# Patient Record
Sex: FEMALE | Race: WHITE | URBAN - METROPOLITAN AREA
[De-identification: names, ages, dates, MRNs, and addresses within clinical notes are randomized per-mention and may not be internally consistent; named-entity substitution may affect disease eponyms.]

---

## 2020-07-06 ENCOUNTER — OFFICE VISIT (OUTPATIENT)
Dept: URBAN - METROPOLITAN AREA TELEHEALTH 2 | Facility: TELEHEALTH | Age: 25
End: 2020-07-06
Payer: COMMERCIAL

## 2020-07-06 DIAGNOSIS — R19.4 CHANGE IN BOWEL HABITS: ICD-10-CM

## 2020-07-06 DIAGNOSIS — Z87.19 HISTORY OF ESOPHAGEAL STRICTURE: ICD-10-CM

## 2020-07-06 DIAGNOSIS — R19.7 DIARRHEA: ICD-10-CM

## 2020-07-06 DIAGNOSIS — K21.0 REFLUX ESOPHAGITIS: ICD-10-CM

## 2020-07-06 PROCEDURE — 99214 OFFICE O/P EST MOD 30 MIN: CPT | Performed by: INTERNAL MEDICINE

## 2020-07-06 PROCEDURE — 1036F TOBACCO NON-USER: CPT | Performed by: INTERNAL MEDICINE

## 2020-07-06 PROCEDURE — G9903 PT SCRN TBCO ID AS NON USER: HCPCS | Performed by: INTERNAL MEDICINE

## 2020-07-06 PROCEDURE — G8420 CALC BMI NORM PARAMETERS: HCPCS | Performed by: INTERNAL MEDICINE

## 2020-07-06 PROCEDURE — G8427 DOCREV CUR MEDS BY ELIG CLIN: HCPCS | Performed by: INTERNAL MEDICINE

## 2020-07-06 RX ORDER — NORGESTIMATE AND ETHINYL ESTRADIOL
TAKE 1 TABLET BY ORAL ROUTE ONCE DAILY KIT 1
Qty: 0 | Refills: 0 | Status: ACTIVE | COMMUNITY
Start: 1900-01-01 | End: 1900-01-01

## 2020-07-06 RX ORDER — OMEPRAZOLE 20 MG/1
TAKE 1 CAPSULE (20 MG) BY ORAL ROUTE ONCE DAILY BEFORE A MEAL CAPSULE, DELAYED RELEASE ORAL 1
Qty: 30 | Refills: 1 | Status: ACTIVE | COMMUNITY
Start: 2019-10-21 | End: 1900-01-01

## 2020-07-06 NOTE — HPI-TODAY'S VISIT:
25-year-old female previously seen in clinic on 10/21/2019 for dysphagia.  Upper endoscopy on 8/2/2018 with normal duodenal biopsies, normal esophageal biopsies aside from reflux inflammation on lower esophageal biopsies.  No evidence of EOE.  Esophageal stricture was dilated with 18 mm balloon and prescription for pantoprazole 40 mg was provided.  Chest pain and dysphasia had resolved post dilation. Patient reports stopped pantoprazole and changed to omeprazole which caused diarrhea which persisted despite stopping omeprazole. No f/c, no melena, no rectal bleeding, no unintentional weight loss, no nocturnal sx. No recent antibiotics, no sick exposure, no food allergies, no new pets. No food allergies or diet restrictions. No chance pregnant, no diet or med changes. Changed to low FODMAP diet which has helped minimally with stools. No eating d/o, no psych hx. No NSAIDs. No alcohol. No hx of pancreatitis. Took Culturelle with no improvement.

## 2020-07-31 ENCOUNTER — WEB ENCOUNTER (OUTPATIENT)
Dept: URBAN - METROPOLITAN AREA CLINIC 96 | Facility: CLINIC | Age: 25
End: 2020-07-31

## 2021-05-26 ENCOUNTER — WEB ENCOUNTER (OUTPATIENT)
Dept: URBAN - METROPOLITAN AREA CLINIC 96 | Facility: CLINIC | Age: 26
End: 2021-05-26

## 2021-06-23 ENCOUNTER — WEB ENCOUNTER (OUTPATIENT)
Dept: URBAN - METROPOLITAN AREA CLINIC 96 | Facility: CLINIC | Age: 26
End: 2021-06-23

## 2021-06-29 ENCOUNTER — WEB ENCOUNTER (OUTPATIENT)
Dept: URBAN - METROPOLITAN AREA CLINIC 96 | Facility: CLINIC | Age: 26
End: 2021-06-29

## 2021-06-29 ENCOUNTER — OFFICE VISIT (OUTPATIENT)
Dept: URBAN - METROPOLITAN AREA CLINIC 96 | Facility: CLINIC | Age: 26
End: 2021-06-29
Payer: COMMERCIAL

## 2021-06-29 VITALS
HEART RATE: 85 BPM | DIASTOLIC BLOOD PRESSURE: 76 MMHG | TEMPERATURE: 97.5 F | BODY MASS INDEX: 20.62 KG/M2 | WEIGHT: 120.8 LBS | HEIGHT: 64 IN | SYSTOLIC BLOOD PRESSURE: 121 MMHG

## 2021-06-29 DIAGNOSIS — Z87.19 HISTORY OF ESOPHAGEAL STRICTURE: ICD-10-CM

## 2021-06-29 DIAGNOSIS — K21.00 CHRONIC REFLUX ESOPHAGITIS: ICD-10-CM

## 2021-06-29 DIAGNOSIS — R13.10 DYSPHAGIA, UNSPECIFIED TYPE: ICD-10-CM

## 2021-06-29 PROCEDURE — 99214 OFFICE O/P EST MOD 30 MIN: CPT | Performed by: INTERNAL MEDICINE

## 2021-06-29 RX ORDER — DEXTROAMPHETAMINE/AMPHETAMINE 10 MG
1 CAPSULE IN THE MORNING CAPSULE, EXT RELEASE 24 HR ORAL ONCE A DAY
Status: ACTIVE | COMMUNITY

## 2021-06-29 RX ORDER — SPIRONOLACTONE 100 MG/1
1 TABLET TABLET, FILM COATED ORAL ONCE A DAY
Status: ACTIVE | COMMUNITY

## 2021-06-29 RX ORDER — OMEPRAZOLE 20 MG/1
TAKE 1 CAPSULE (20 MG) BY ORAL ROUTE ONCE DAILY BEFORE A MEAL CAPSULE, DELAYED RELEASE ORAL 1
Qty: 30 | Refills: 1 | Status: DISCONTINUED | COMMUNITY
Start: 2019-10-21

## 2021-06-29 RX ORDER — NORGESTIMATE AND ETHINYL ESTRADIOL
TAKE 1 TABLET BY ORAL ROUTE ONCE DAILY KIT 1
Qty: 0 | Refills: 0 | Status: ACTIVE | COMMUNITY
Start: 1900-01-01

## 2021-06-29 NOTE — PHYSICAL EXAM GASTROINTESTINAL
Abdomen , soft, minimally tender in the epigastric area , no guarding or rigidity , no masses palpable , normal bowel sounds , Liver and Spleen , no hepatomegaly present , no hepatosplenomegaly , liver nontender , spleen not palpable

## 2021-06-29 NOTE — HPI-TODAY'S VISIT:
26-year-old female previously seen in clinic on 7/6/2020 via Telehealth, previously seen for dysphagia.  Upper endoscopy on 8/2/2018 with normal duodenal biopsies, normal esophageal biopsies aside from reflux inflammation on lower esophageal biopsies.  No evidence of EOE.  Esophageal stricture was dilated with 18 mm balloon and prescription for pantoprazole 40 mg was provided.  Chest pain and dysphasia had resolved post dilation.   Previously reported stopped pantoprazole and changed to omeprazole which caused diarrhea which persisted despite stopping omeprazole. No f/c, no melena, no rectal bleeding, no unintentional weight loss, no nocturnal sx. No recent antibiotics, no sick exposure, no food allergies, no new pets. No food allergies or diet restrictions. No chance pregnant, no diet or med changes. Changed to low FODMAP diet which has helped minimally with stools. No eating d/o, no psych hx. No NSAIDs. No alcohol. No hx of pancreatitis. Took Culturelle with no improvement.  Patient reports did not turn in stool samples as previously ordered. Reports she did not feel was needed given diarrhea intermittently longstanding.  Now presents for dysphagia. No hematemesis, no coffee ground emesis. No melena, no rectal bleeding. Solid food dysphagia. Not taking any meds for GERD currently. No diet changes. Now on Adderall for ADHD. Also on spironolactone for acne. No NSAIDs. Similar sx she experienced prior to 2018 dilation. No chance pregnant. No regular alcohol.

## 2021-08-20 ENCOUNTER — OFFICE VISIT (OUTPATIENT)
Dept: URBAN - METROPOLITAN AREA SURGERY CENTER 18 | Facility: SURGERY CENTER | Age: 26
End: 2021-08-20
Payer: COMMERCIAL

## 2021-08-20 DIAGNOSIS — K22.2 ACQUIRED ESOPHAGEAL RING: ICD-10-CM

## 2021-08-20 DIAGNOSIS — R13.19 CERVICAL DYSPHAGIA: ICD-10-CM

## 2021-08-20 DIAGNOSIS — K31.89 ACQUIRED DEFORMITY OF DUODENUM: ICD-10-CM

## 2021-08-20 PROCEDURE — G8907 PT DOC NO EVENTS ON DISCHARG: HCPCS | Performed by: INTERNAL MEDICINE

## 2021-08-20 PROCEDURE — 43249 ESOPH EGD DILATION <30 MM: CPT | Performed by: INTERNAL MEDICINE

## 2021-08-20 PROCEDURE — 43239 EGD BIOPSY SINGLE/MULTIPLE: CPT | Performed by: INTERNAL MEDICINE

## 2021-10-06 ENCOUNTER — WEB ENCOUNTER (OUTPATIENT)
Dept: URBAN - METROPOLITAN AREA CLINIC 96 | Facility: CLINIC | Age: 26
End: 2021-10-06

## 2021-10-06 ENCOUNTER — OFFICE VISIT (OUTPATIENT)
Dept: URBAN - METROPOLITAN AREA CLINIC 96 | Facility: CLINIC | Age: 26
End: 2021-10-06
Payer: COMMERCIAL

## 2021-10-06 VITALS
SYSTOLIC BLOOD PRESSURE: 111 MMHG | HEART RATE: 79 BPM | DIASTOLIC BLOOD PRESSURE: 76 MMHG | TEMPERATURE: 96.6 F | HEIGHT: 64 IN | WEIGHT: 124.8 LBS | BODY MASS INDEX: 21.31 KG/M2

## 2021-10-06 DIAGNOSIS — K22.2 ESOPHAGEAL STRICTURE: ICD-10-CM

## 2021-10-06 DIAGNOSIS — R13.10 DYSPHAGIA, UNSPECIFIED TYPE: ICD-10-CM

## 2021-10-06 DIAGNOSIS — K21.0 REFLUX ESOPHAGITIS: ICD-10-CM

## 2021-10-06 DIAGNOSIS — Z87.19 HISTORY OF ESOPHAGEAL STRICTURE: ICD-10-CM

## 2021-10-06 PROCEDURE — 99214 OFFICE O/P EST MOD 30 MIN: CPT | Performed by: INTERNAL MEDICINE

## 2021-10-06 RX ORDER — DEXTROAMPHETAMINE/AMPHETAMINE 10 MG
1 CAPSULE IN THE MORNING CAPSULE, EXT RELEASE 24 HR ORAL ONCE A DAY
Status: ACTIVE | COMMUNITY

## 2021-10-06 RX ORDER — SPIRONOLACTONE 100 MG/1
1 TABLET TABLET, FILM COATED ORAL ONCE A DAY
Status: ACTIVE | COMMUNITY

## 2021-10-06 RX ORDER — FAMOTIDINE 20 MG/1
1 TABLET TABLET, FILM COATED ORAL BID
Qty: 60 | Refills: 6 | OUTPATIENT
Start: 2021-10-06

## 2021-10-06 RX ORDER — NORGESTIMATE AND ETHINYL ESTRADIOL
TAKE 1 TABLET BY ORAL ROUTE ONCE DAILY KIT 1
Qty: 0 | Refills: 0 | Status: ACTIVE | COMMUNITY
Start: 1900-01-01

## 2021-10-06 NOTE — HPI-TODAY'S VISIT:
26-year-old female seen 6/29/2021 for dysphagia.  Upper endoscopy on 8/2/2018 with normal duodenal biopsies, normal esophageal biopsies aside from reflux inflammation on lower esophageal biopsies.  No evidence of EOE.  Esophageal stricture was dilated with 18 mm balloon and prescription for pantoprazole 40 mg was provided.  Chest pain and dysphasia had resolved post dilation.   Previously reported stopped pantoprazole and changed to omeprazole which caused diarrhea which persisted despite stopping omeprazole. No f/c, no melena, no rectal bleeding, no unintentional weight loss, no nocturnal sx. No recent antibiotics, no sick exposure, no food allergies, no new pets. No food allergies or diet restrictions. No chance pregnant, no diet or med changes. Changed to low FODMAP diet which has helped minimally with stools. No eating d/o, no psych hx. No NSAIDs. No alcohol. No hx of pancreatitis. Took Culturelle with no improvement.  Patient reports did not turn in stool samples as previously ordered. Reports she did not feel was needed given diarrhea intermittently longstanding.  Reported dysphagia. No hematemesis, no coffee ground emesis. No melena, no rectal bleeding. Solid food dysphagia. Not taking any meds for GERD currently. No diet changes. Now on Adderall for ADHD. Also on spironolactone for acne. No NSAIDs. Similar sx she experienced prior to 2018 dilation. No chance pregnant. No regular alcohol.  EGD done 8/20/2021 with normal duodenal bx, normal gastric bx, normal upper and lower esophageal bx with no EoE. Esophageal dilation performed with 18 mm balloon.  Patient reports overall improved. No dysphagia, occasionally will have mild odynophagia with swallowing.

## 2021-11-01 ENCOUNTER — ERX REFILL RESPONSE (OUTPATIENT)
Dept: URBAN - METROPOLITAN AREA CLINIC 96 | Facility: CLINIC | Age: 26
End: 2021-11-01

## 2021-11-01 RX ORDER — FAMOTIDINE 20 MG/1
1 TABLET TABLET, FILM COATED ORAL BID
Qty: 60 | Refills: 6 | OUTPATIENT

## 2021-11-01 RX ORDER — FAMOTIDINE 20 MG/1
TAKE 1 TABLET BY MOUTH TWICE A DAY FOR 30 DAYS TABLET, FILM COATED ORAL
Qty: 60 TABLET | Refills: 7 | OUTPATIENT

## 2021-11-22 ENCOUNTER — WEB ENCOUNTER (OUTPATIENT)
Dept: URBAN - METROPOLITAN AREA CLINIC 96 | Facility: CLINIC | Age: 26
End: 2021-11-22

## 2021-11-22 ENCOUNTER — OFFICE VISIT (OUTPATIENT)
Dept: URBAN - METROPOLITAN AREA CLINIC 96 | Facility: CLINIC | Age: 26
End: 2021-11-22
Payer: COMMERCIAL

## 2021-11-22 ENCOUNTER — DASHBOARD ENCOUNTERS (OUTPATIENT)
Age: 26
End: 2021-11-22

## 2021-11-22 VITALS
HEIGHT: 64 IN | HEART RATE: 80 BPM | BODY MASS INDEX: 21 KG/M2 | WEIGHT: 123 LBS | TEMPERATURE: 98.7 F | DIASTOLIC BLOOD PRESSURE: 63 MMHG | SYSTOLIC BLOOD PRESSURE: 101 MMHG

## 2021-11-22 DIAGNOSIS — Z87.19 HISTORY OF ESOPHAGEAL STRICTURE: ICD-10-CM

## 2021-11-22 DIAGNOSIS — R13.19 DYSPHAGIA: ICD-10-CM

## 2021-11-22 DIAGNOSIS — K21.00 CHRONIC REFLUX ESOPHAGITIS: ICD-10-CM

## 2021-11-22 DIAGNOSIS — K22.2 ESOPHAGEAL STRICTURE: ICD-10-CM

## 2021-11-22 PROBLEM — 40739000: Status: ACTIVE | Noted: 2021-06-29

## 2021-11-22 PROBLEM — 266433003 REFLUX ESOPHAGITIS: Status: ACTIVE | Noted: 2021-06-28

## 2021-11-22 PROCEDURE — 99213 OFFICE O/P EST LOW 20 MIN: CPT | Performed by: INTERNAL MEDICINE

## 2021-11-22 RX ORDER — DEXTROAMPHETAMINE/AMPHETAMINE 10 MG
1 CAPSULE IN THE MORNING CAPSULE, EXT RELEASE 24 HR ORAL ONCE A DAY
Status: ACTIVE | COMMUNITY

## 2021-11-22 RX ORDER — SPIRONOLACTONE 100 MG/1
1 TABLET TABLET, FILM COATED ORAL ONCE A DAY
Status: ACTIVE | COMMUNITY

## 2021-11-22 RX ORDER — FAMOTIDINE 40 MG/1
1 TABLET TABLET, FILM COATED ORAL BID
Qty: 60 | Refills: 6 | OUTPATIENT

## 2021-11-22 RX ORDER — FAMOTIDINE 20 MG/1
TAKE 1 TABLET BY MOUTH TWICE A DAY FOR 30 DAYS TABLET, FILM COATED ORAL
Qty: 60 TABLET | Refills: 7 | Status: ACTIVE | COMMUNITY

## 2021-11-22 RX ORDER — NORGESTIMATE AND ETHINYL ESTRADIOL
TAKE 1 TABLET BY ORAL ROUTE ONCE DAILY KIT 1
Qty: 0 | Refills: 0 | Status: ACTIVE | COMMUNITY
Start: 1900-01-01

## 2021-11-22 NOTE — HPI-TODAY'S VISIT:
Once a weeks he will still get the feeling of food wanting to come back up - and some odynophagia Mucus comes up at times Is painful until the mucus comes up  Was worse before the Pepcid Has not had a barium swallow test done Her last egd was 8/21 - dilated - pathology looked great No abd pain More with breads, cheeses and french fries BM are wnl - no more diarrhea on a regular basis In past when on a PPI got diarrhea but then she stopped it and it continued for 1 year after - doubt was the PPI since lasted for a year off the medication No BRBRP or melena

## 2021-11-30 ENCOUNTER — LAB OUTSIDE AN ENCOUNTER (OUTPATIENT)
Dept: URBAN - METROPOLITAN AREA CLINIC 96 | Facility: CLINIC | Age: 26
End: 2021-11-30

## 2021-12-01 ENCOUNTER — TELEPHONE ENCOUNTER (OUTPATIENT)
Dept: URBAN - METROPOLITAN AREA CLINIC 92 | Facility: CLINIC | Age: 26
End: 2021-12-01

## 2021-12-01 ENCOUNTER — LAB OUTSIDE AN ENCOUNTER (OUTPATIENT)
Dept: URBAN - METROPOLITAN AREA CLINIC 92 | Facility: CLINIC | Age: 26
End: 2021-12-01

## 2021-12-06 ENCOUNTER — WEB ENCOUNTER (OUTPATIENT)
Dept: URBAN - METROPOLITAN AREA CLINIC 96 | Facility: CLINIC | Age: 26
End: 2021-12-06

## 2021-12-29 ENCOUNTER — TELEPHONE ENCOUNTER (OUTPATIENT)
Dept: URBAN - METROPOLITAN AREA CLINIC 40 | Facility: CLINIC | Age: 26
End: 2021-12-29

## 2022-01-10 PROBLEM — 63305008: Status: ACTIVE | Noted: 2021-10-06

## 2022-01-16 ENCOUNTER — WEB ENCOUNTER (OUTPATIENT)
Dept: URBAN - METROPOLITAN AREA CLINIC 96 | Facility: CLINIC | Age: 27
End: 2022-01-16

## 2022-01-18 ENCOUNTER — OFFICE VISIT (OUTPATIENT)
Dept: URBAN - METROPOLITAN AREA MEDICAL CENTER 28 | Facility: MEDICAL CENTER | Age: 27
End: 2022-01-18
Payer: COMMERCIAL

## 2022-01-18 DIAGNOSIS — R13.19 CERVICAL DYSPHAGIA: ICD-10-CM

## 2022-01-18 DIAGNOSIS — R93.3 ABN FINDINGS-GI TRACT: ICD-10-CM

## 2022-01-18 PROCEDURE — 91010 ESOPHAGUS MOTILITY STUDY: CPT | Performed by: INTERNAL MEDICINE

## 2022-01-29 ENCOUNTER — WEB ENCOUNTER (OUTPATIENT)
Dept: URBAN - METROPOLITAN AREA CLINIC 96 | Facility: CLINIC | Age: 27
End: 2022-01-29

## 2022-05-04 ENCOUNTER — ERX REFILL RESPONSE (OUTPATIENT)
Dept: URBAN - METROPOLITAN AREA CLINIC 96 | Facility: CLINIC | Age: 27
End: 2022-05-04

## 2022-05-04 RX ORDER — FAMOTIDINE 20 MG/1
TAKE 1 TABLET BY MOUTH TWICE A DAY TABLET, FILM COATED ORAL
Qty: 180 TABLET | Refills: 3 | OUTPATIENT

## 2022-05-04 RX ORDER — FAMOTIDINE 20 MG/1
TAKE 1 TABLET BY MOUTH TWICE A DAY FOR 30 DAYS TABLET, FILM COATED ORAL
Qty: 60 TABLET | Refills: 7 | OUTPATIENT